# Patient Record
Sex: MALE | Race: WHITE | URBAN - METROPOLITAN AREA
[De-identification: names, ages, dates, MRNs, and addresses within clinical notes are randomized per-mention and may not be internally consistent; named-entity substitution may affect disease eponyms.]

---

## 2023-03-20 ENCOUNTER — OFFICE VISIT (OUTPATIENT)
Dept: URBAN - METROPOLITAN AREA CLINIC 19 | Facility: CLINIC | Age: 78
End: 2023-03-20
Payer: MEDICARE

## 2023-03-20 ENCOUNTER — LAB OUTSIDE AN ENCOUNTER (OUTPATIENT)
Dept: URBAN - METROPOLITAN AREA CLINIC 19 | Facility: CLINIC | Age: 78
End: 2023-03-20

## 2023-03-20 ENCOUNTER — DASHBOARD ENCOUNTERS (OUTPATIENT)
Age: 78
End: 2023-03-20

## 2023-03-20 ENCOUNTER — WEB ENCOUNTER (OUTPATIENT)
Dept: URBAN - METROPOLITAN AREA CLINIC 19 | Facility: CLINIC | Age: 78
End: 2023-03-20

## 2023-03-20 VITALS
DIASTOLIC BLOOD PRESSURE: 66 MMHG | BODY MASS INDEX: 27.41 KG/M2 | SYSTOLIC BLOOD PRESSURE: 118 MMHG | HEART RATE: 77 BPM | TEMPERATURE: 98.5 F | WEIGHT: 202.4 LBS | HEIGHT: 72 IN

## 2023-03-20 DIAGNOSIS — R10.31 RIGHT LOWER QUADRANT ABDOMINAL PAIN: ICD-10-CM

## 2023-03-20 DIAGNOSIS — K59.04 CHRONIC IDIOPATHIC CONSTIPATION: ICD-10-CM

## 2023-03-20 DIAGNOSIS — Z86.010 HISTORY OF COLON POLYPS: ICD-10-CM

## 2023-03-20 DIAGNOSIS — R91.8 LUNG MASS: ICD-10-CM

## 2023-03-20 DIAGNOSIS — K76.9 LIVER LESION, LEFT LOBE: ICD-10-CM

## 2023-03-20 PROBLEM — 300331000: Status: ACTIVE | Noted: 2023-03-20

## 2023-03-20 PROBLEM — 82934008: Status: ACTIVE | Noted: 2023-03-20

## 2023-03-20 PROBLEM — 428283002: Status: ACTIVE | Noted: 2023-03-20

## 2023-03-20 PROCEDURE — 99204 OFFICE O/P NEW MOD 45 MIN: CPT | Performed by: INTERNAL MEDICINE

## 2023-03-20 RX ORDER — LOVASTATIN 10 MG/1
1 TABLET WITH THE EVENING MEAL TABLET ORAL ONCE A DAY
Status: ACTIVE | COMMUNITY

## 2023-03-20 RX ORDER — HYDROCHLOROTHIAZIDE 25 MG/1
1 TABLET IN THE MORNING TABLET ORAL ONCE A DAY
Status: ACTIVE | COMMUNITY

## 2023-03-20 RX ORDER — TAMSULOSIN HYDROCHLORIDE 0.4 MG/1
1 CAPSULE CAPSULE ORAL ONCE A DAY
Status: ACTIVE | COMMUNITY

## 2023-03-20 RX ORDER — PANTOPRAZOLE SODIUM 40 MG/1
1 TABLET TABLET, DELAYED RELEASE ORAL ONCE A DAY
Status: ACTIVE | COMMUNITY

## 2023-03-20 RX ORDER — LOSARTAN POTASSIUM 25 MG/1
1 TABLET TABLET ORAL ONCE A DAY
Status: ACTIVE | COMMUNITY

## 2023-03-20 NOTE — HPI-ZZZTODAY'S VISIT
77-year-old male presents to the office for hospital follow-up.  He was seen in the emergency room 3/15/2023 for right-sided abdominal pain intermittently over the last year., along with some small loose stools.  Labs CBC normal, CMP normal.  CT abdomen and pelvis shows possible small mass in the right middle lobe of the lung measuring 1.7 x 1 cm in size.  Subcentimeter lesion posterior segment of the liver.  Small umbilical hernia with a minimal amount of small bowel within it.  Prostatamegaly.  Large amount of fecal material throughout the colon.  He needs to see pulmonology for PET/CT for his pulmonary mass, he has appt Friday with them.   He was having RLQ abdominal pain that started about 6 months ago, intermittent, mostly at night, along with some constipation. Constipation has been present for years. He is Miralax daily, is working well.  Denies blood in stools, black stools.  He has frequent clearing of throat due to reflux, is taking pantoprazole. Denies nausea/vomiting, heartburn, wt loss.   Last Colonoscopy was about 5 years, in [place], colon polyps, is due in August. Denies alcohol/smoking.

## 2023-03-21 LAB — AFP, SERUM, TUMOR MARKER: 3.2

## 2023-04-06 ENCOUNTER — LAB OUTSIDE AN ENCOUNTER (OUTPATIENT)
Dept: URBAN - METROPOLITAN AREA CLINIC 19 | Facility: CLINIC | Age: 78
End: 2023-04-06

## 2024-05-01 ENCOUNTER — OFFICE VISIT (OUTPATIENT)
Dept: URBAN - METROPOLITAN AREA SURGERY CENTER 24 | Facility: SURGERY CENTER | Age: 79
End: 2024-05-01